# Patient Record
Sex: MALE | Race: WHITE | NOT HISPANIC OR LATINO | Employment: FULL TIME | URBAN - METROPOLITAN AREA
[De-identification: names, ages, dates, MRNs, and addresses within clinical notes are randomized per-mention and may not be internally consistent; named-entity substitution may affect disease eponyms.]

---

## 2022-04-13 ENCOUNTER — HOSPITAL ENCOUNTER (EMERGENCY)
Facility: CLINIC | Age: 27
Discharge: HOME | End: 2022-04-13
Attending: PHYSICIAN ASSISTANT | Admitting: PHYSICIAN ASSISTANT
Payer: OTHER MISCELLANEOUS

## 2022-04-13 VITALS
DIASTOLIC BLOOD PRESSURE: 87 MMHG | BODY MASS INDEX: 24.5 KG/M2 | RESPIRATION RATE: 16 BRPM | OXYGEN SATURATION: 99 % | HEART RATE: 65 BPM | WEIGHT: 175 LBS | TEMPERATURE: 97.1 F | HEIGHT: 71 IN | SYSTOLIC BLOOD PRESSURE: 145 MMHG

## 2022-04-13 DIAGNOSIS — S61.213A LACERATION OF LEFT MIDDLE FINGER WITHOUT FOREIGN BODY WITHOUT DAMAGE TO NAIL, INITIAL ENCOUNTER: ICD-10-CM

## 2022-04-13 PROCEDURE — 99282 EMERGENCY DEPT VISIT SF MDM: CPT | Mod: 25

## 2022-04-13 PROCEDURE — 250N000011 HC RX IP 250 OP 636: Performed by: PHYSICIAN ASSISTANT

## 2022-04-13 PROCEDURE — 90471 IMMUNIZATION ADMIN: CPT | Performed by: PHYSICIAN ASSISTANT

## 2022-04-13 PROCEDURE — 90715 TDAP VACCINE 7 YRS/> IM: CPT | Performed by: PHYSICIAN ASSISTANT

## 2022-04-13 RX ADMIN — CLOSTRIDIUM TETANI TOXOID ANTIGEN (FORMALDEHYDE INACTIVATED), CORYNEBACTERIUM DIPHTHERIAE TOXOID ANTIGEN (FORMALDEHYDE INACTIVATED), BORDETELLA PERTUSSIS TOXOID ANTIGEN (GLUTARALDEHYDE INACTIVATED), BORDETELLA PERTUSSIS FILAMENTOUS HEMAGGLUTININ ANTIGEN (FORMALDEHYDE INACTIVATED), BORDETELLA PERTUSSIS PERTACTIN ANTIGEN, AND BORDETELLA PERTUSSIS FIMBRIAE 2/3 ANTIGEN 0.5 ML: 5; 2; 2.5; 5; 3; 5 INJECTION, SUSPENSION INTRAMUSCULAR at 17:28

## 2022-04-13 ASSESSMENT — ENCOUNTER SYMPTOMS
COLOR CHANGE: 0
WEAKNESS: 0
NUMBNESS: 0
WOUND: 1

## 2022-04-13 NOTE — ED PROVIDER NOTES
"  History     Chief Complaint:  Hand Injury       HPI   Kt Hill is a 26 year old male who presents with laceration to his left middle finger after using a knife at work today.  He is right-handed.  He does not know his last tetanus shot.  Denies any numbness of his finger.  Date of injury is 4/13/22.  Is a Workmen's Comp. Injury.     ROS:  Review of Systems   Skin: Positive for wound. Negative for color change.   Neurological: Negative for weakness and numbness.   All other systems reviewed and are negative.      Allergies:  No Known Allergies     Medications:      None    Past Medical History:    None    Social History:       Presents alone to the emergency department.    Physical Exam     Patient Vitals for the past 24 hrs:   BP Temp Temp src Pulse Resp SpO2 Height Weight   04/13/22 1557 (!) 145/87 97.1  F (36.2  C) Oral 65 16 99 % 1.803 m (5' 11\") 79.4 kg (175 lb)        Physical Exam  Musculoskeletal:      Right hand: No bony tenderness. Normal strength. Normal sensation.        Hands:       Comments: Left distal middle finger pad avulsion with avulsion of the distal nail.  Nail matrix intact.  No exposed bone.      Has full range of motion of the left middle finger at the DIP and PIP joints.       Neuro: Sensation distal to injury.  Vascular: Cap refill of the distal finger.      Emergency Department Course   ECG:      Imaging:  No orders to display        Laboratory:  Labs Ordered and Resulted from Time of ED Arrival to Time of ED Departure - No data to display     Procedures   Wound was not suturable.    Emergency Department Course:         Reviewed:  I reviewed nursing notes, vitals and past medical history    Assessments/Consults:          Interventions:  Medications   Tdap (tetanus-diphtheria-acell pertussis) (ADACEL) injection 0.5 mL (0.5 mLs Intramuscular Given 4/13/22 1728)        Disposition:  The patient was discharged to home.     Impression & Plan      Medical Decision Making:    Kt is a " 26-year-old male that presents to the emergency department following a work injury he cut his left tip of his middle finger.  He had avulsion of the distal pad of his finger with the distal nail injury as well.  Based off his physical exam findings I considered multiple diagnoses including but not limited to fracture, laceration, vascular injury, nerve injury, ligament injury, nail matrix injury.      After carefully reviewing the patient's past medical history history of present illness laboratory values and work-up and physical exam my impression of today's diagnosis is:     ICD-10-CM    1. Laceration of left middle finger without foreign body without damage to nail, initial encounter  S61.213A      There is no wound to suture closed.  He avulsed the distal skin.  There is no bulb bone.  The injury was distal to where I suspect the bone is.  Did remove the end of his nail but the rest of the nail is intact with no violation of the paronychia or matrix.  Thus I left the nail in place.  Hemostasis was achieved and the patient's finger was wrapped.  I would like him to follow-up with hand specialist to ensure adequate healing.  Monitor for signs of infection which include erythema, swelling, purulent discharge, increasing pain.  If he notes any of these symptoms or has any further concerns he should return or seek evaluation.  Swimming until cleared by hand.      Discharge Medications:  New Prescriptions    No medications on file        4/13/2022   Mauricio Rodriguez PA-C Kruger, Jacob C, PA-C  04/13/22 2742

## 2022-04-13 NOTE — Clinical Note
Kt Thrasher was seen and treated in our emergency department on 4/13/2022.  He may return to work on 04/18/2022.  Keep left hand covered at all times.     If you have any questions or concerns, please don't hesitate to call.      Mauricio Rodriguez, JEANIE

## 2022-05-22 ENCOUNTER — HEALTH MAINTENANCE LETTER (OUTPATIENT)
Age: 27
End: 2022-05-22

## 2022-06-21 ENCOUNTER — OFFICE VISIT (OUTPATIENT)
Dept: URGENT CARE | Facility: URGENT CARE | Age: 27
End: 2022-06-21
Payer: COMMERCIAL

## 2022-06-21 VITALS
WEIGHT: 175 LBS | DIASTOLIC BLOOD PRESSURE: 74 MMHG | SYSTOLIC BLOOD PRESSURE: 116 MMHG | TEMPERATURE: 98.7 F | OXYGEN SATURATION: 97 % | HEIGHT: 71 IN | HEART RATE: 69 BPM | BODY MASS INDEX: 24.5 KG/M2

## 2022-06-21 DIAGNOSIS — H10.33 ACUTE BACTERIAL CONJUNCTIVITIS OF BOTH EYES: ICD-10-CM

## 2022-06-21 DIAGNOSIS — H57.89 IRRITATION OF BOTH EYES: Primary | ICD-10-CM

## 2022-06-21 PROCEDURE — 99203 OFFICE O/P NEW LOW 30 MIN: CPT | Performed by: FAMILY MEDICINE

## 2022-06-21 RX ORDER — POLYMYXIN B SULFATE AND TRIMETHOPRIM 1; 10000 MG/ML; [USP'U]/ML
1-2 SOLUTION OPHTHALMIC EVERY 4 HOURS
Qty: 5 ML | Refills: 0 | Status: SHIPPED | OUTPATIENT
Start: 2022-06-21 | End: 2022-06-21

## 2022-06-21 RX ORDER — POLYMYXIN B SULFATE AND TRIMETHOPRIM 1; 10000 MG/ML; [USP'U]/ML
1-2 SOLUTION OPHTHALMIC EVERY 4 HOURS
Qty: 5 ML | Refills: 0 | Status: SHIPPED | OUTPATIENT
Start: 2022-06-21

## 2022-06-21 NOTE — LETTER
[facility] [place]  [address] [place] [address]  Phone: [phone]    06/21/22    Kt Thrasher  [address]  [place] [address]      To whom it may concern:     Kt Thrasher was seen in clinic today for current illness , he needs to be off for 2 days and get back on 6/23/2022  Sincerely,      [facility] Provider

## 2022-06-21 NOTE — PROGRESS NOTES
Chief Complaint   Patient presents with     Urgent Care     Eye Problem     Pt in clinic to have eval for bilateral eye irritation and discharge.       ASSESMENT AND PLAN   Kt was seen today for urgent care and eye problem.    Diagnoses and all orders for this visit:    Irritation of both eyes    Acute bacterial conjunctivitis of both eyes  -     trimethoprim-polymyxin b (POLYTRIM) 90771-8.1 UNIT/ML-% ophthalmic solution; Place 1-2 drops into both eyes every 4 hours for 7 days        Discussed with pt about the condition   letter for work given    Initial differential diagnosis included but was not restricted to   Differential Diagnosis:  Eye Problem: Bacterial conjunctivitis  Viral conjunctivitis  Allergic conjunctivitis  Stye (internal)  Foreign body  Blepharitis  Iritis  Medical Decision Making:  As there is no eye pain and vision changes and symptoms seem to be related to conjunctivitis   no further testing is needed       Routine discharge counseling was given to the patient and the patient understands that worsening, changing or persistent symptoms should prompt an immediate call or follow up with their primary physician or the emergency department. The importance of appropriate follow up was also discussed with the patient.     I have reviewed the nursing notes.    Time  spent on the date of the encounter doing chart review, patient visit and documentation   see orders in Epic  Pt verbalized and agreed with the plan and is aware of the worsening symptoms for which would need to follow up .  Pt was stable during time of discharge from the clinic     SUBJECTIVE     Kt Thrasher is a 26 year old male presenting with a chief complaint of    Chief Complaint   Patient presents with     Urgent Care     Eye Problem     Pt in clinic to have eval for bilateral eye irritation and discharge.     Eye Problem    Onset of symptoms was 4 day(s) ago.   Location: both eyes , rt more than left   Course of illness is waxing  "and waning.    Severity moderate  Current and Associated symptoms: discharge, redness  Treatment measures tried include none  Context: none        No past medical history on file.  No current outpatient medications on file.     Social History     Tobacco Use     Smoking status: Never Smoker     Smokeless tobacco: Never Used   Substance Use Topics     Alcohol use: Not on file     No family history on file.      ROS:    10 point ROS of systems including Constitutional,Respiratory, Cardiovascular, Gastroenterology, Genitourinary, Integumentary, Muscularskeletal, Psychiatric ,neurological were all negative except for pertinent positives noted in my HPI         OBJECTIVE:    /74   Pulse 69   Temp 98.7  F (37.1  C) (Temporal)   Ht 1.803 m (5' 11\")   Wt 79.4 kg (175 lb)   SpO2 97%   BMI 24.41 kg/m    GENERAL APPEARANCE: healthy, alert and no distress  EYES: EOMI,  PERRL, conjunctiva redness noted   HENT: ear canals and TM's normal.  Nose and mouth without ulcers, erythema or lesions  NECK: supple, nontender, no lymphadenopathy  CV: regular rates and rhythm, normal S1 S2, no murmur noted  PSYCH: mentation appears normal  Physical Exam      (Note was completed, in part, with [email or web address] voice-recognition software. Documentation reviewed, but some grammatical, spelling, and word errors may remain.)  Allie De León MD                     "

## 2022-09-25 ENCOUNTER — HEALTH MAINTENANCE LETTER (OUTPATIENT)
Age: 27
End: 2022-09-25

## 2023-06-04 ENCOUNTER — HEALTH MAINTENANCE LETTER (OUTPATIENT)
Age: 28
End: 2023-06-04

## 2024-07-20 ENCOUNTER — HEALTH MAINTENANCE LETTER (OUTPATIENT)
Age: 29
End: 2024-07-20